# Patient Record
Sex: MALE | Race: WHITE | NOT HISPANIC OR LATINO | Employment: FULL TIME | URBAN - METROPOLITAN AREA
[De-identification: names, ages, dates, MRNs, and addresses within clinical notes are randomized per-mention and may not be internally consistent; named-entity substitution may affect disease eponyms.]

---

## 2017-01-20 ENCOUNTER — OFFICE VISIT - RIVER FALLS (OUTPATIENT)
Dept: FAMILY MEDICINE | Facility: CLINIC | Age: 32
End: 2017-01-20

## 2017-02-10 ENCOUNTER — OFFICE VISIT - RIVER FALLS (OUTPATIENT)
Dept: FAMILY MEDICINE | Facility: CLINIC | Age: 32
End: 2017-02-10

## 2017-02-10 ASSESSMENT — MIFFLIN-ST. JEOR: SCORE: 1548.81

## 2017-02-15 ENCOUNTER — OFFICE VISIT - RIVER FALLS (OUTPATIENT)
Dept: FAMILY MEDICINE | Facility: CLINIC | Age: 32
End: 2017-02-15

## 2017-02-15 ENCOUNTER — COMMUNICATION - RIVER FALLS (OUTPATIENT)
Dept: FAMILY MEDICINE | Facility: CLINIC | Age: 32
End: 2017-02-15

## 2017-04-26 ENCOUNTER — OFFICE VISIT - RIVER FALLS (OUTPATIENT)
Dept: FAMILY MEDICINE | Facility: CLINIC | Age: 32
End: 2017-04-26

## 2017-04-26 ENCOUNTER — COMMUNICATION - RIVER FALLS (OUTPATIENT)
Dept: FAMILY MEDICINE | Facility: CLINIC | Age: 32
End: 2017-04-26

## 2017-07-10 ENCOUNTER — OFFICE VISIT - RIVER FALLS (OUTPATIENT)
Dept: FAMILY MEDICINE | Facility: CLINIC | Age: 32
End: 2017-07-10

## 2017-07-10 ASSESSMENT — MIFFLIN-ST. JEOR: SCORE: 1533.39

## 2019-08-21 ENCOUNTER — OFFICE VISIT - RIVER FALLS (OUTPATIENT)
Dept: FAMILY MEDICINE | Facility: CLINIC | Age: 34
End: 2019-08-21

## 2022-02-11 VITALS
DIASTOLIC BLOOD PRESSURE: 80 MMHG | WEIGHT: 149.6 LBS | HEIGHT: 66 IN | SYSTOLIC BLOOD PRESSURE: 110 MMHG | TEMPERATURE: 98.6 F | HEART RATE: 68 BPM | BODY MASS INDEX: 24.04 KG/M2

## 2022-02-11 VITALS
HEART RATE: 78 BPM | OXYGEN SATURATION: 97 % | TEMPERATURE: 98.8 F | DIASTOLIC BLOOD PRESSURE: 74 MMHG | BODY MASS INDEX: 24.33 KG/M2 | WEIGHT: 149.6 LBS | SYSTOLIC BLOOD PRESSURE: 124 MMHG

## 2022-02-11 VITALS
TEMPERATURE: 98.6 F | HEIGHT: 66 IN | HEART RATE: 84 BPM | SYSTOLIC BLOOD PRESSURE: 131 MMHG | DIASTOLIC BLOOD PRESSURE: 78 MMHG | BODY MASS INDEX: 24.59 KG/M2 | HEART RATE: 64 BPM | DIASTOLIC BLOOD PRESSURE: 76 MMHG | RESPIRATION RATE: 16 BRPM | SYSTOLIC BLOOD PRESSURE: 108 MMHG | TEMPERATURE: 98 F | WEIGHT: 153 LBS

## 2022-02-16 NOTE — PROGRESS NOTES
Patient:   HEIDI WALLER            MRN: [id number]            FIN: [id number]               Age:   31 years     Sex:  Male     :  1985   Associated Diagnoses:   Insomnia   Author:   Marty Currie PA-C      Chief Complaint   2/10/2017 9:08 AM CST    Pt here for asking for sleeping pills for upcoming flight to [place]       History of Present Illness   Chief complaint and symptoms noted above and confirmed with patient   as above, he is taking an overnight flight to [place] and wants to be able to sleep on the plane         Review of Systems   Constitutional:  Negative.    All other systems reviewed and negative      Health Status   Allergies:    Allergic Reactions (Selected)  No known allergies   Medications:  (Selected)   Prescriptions  Prescribed  Anti-Static Valved Holding Chamber: Anti-Static Valved Holding Chamber, See Instructions, Instructions: To be used with Flovent., Supply, # 1 kit(s), 0 Refill(s), Type: Maintenance, Pharmacy: [facility] [id number], To be used with Flovent.  Flovent HFA 44 mcg/inh inhalation aerosol: ( 88 mcg ), inh, bid, # 1 EA, 10 Refill(s), Type: Maintenance, Pharmacy: [facility] [address], 88 mcg inh bid  Ventolin HFA 90 mcg/inh inhalation aerosol: See Instructions, Instructions: USE 2 PUFFS FOUR TIMES DAILY AS NEEDED FOR WHEEZING, USE WITH SPACER CHAMBER, # 36 gm, 5 Refill(s), Type: Soft Stop, Pharmacy: [facility] [id number], USE 2 PUFFS FOUR TIMES DAILY AS NEEDED FOR WHEEZING, USE WITH SP...   Problem list:    All Problems (Selected)  Asthma, Mild Intermittent / ICD-9-.90 / Confirmed      Histories   Past Medical History:    Resolved  History of chicken pox (V12.09):  Resolved.   Family History:    [place] - Lung cancer  Father (Shahid, )  Asthma  Son (Ida)     Procedure history:    None ([id number]).      Physical Examination   Vital Signs   2/10/2017 9:08 AM CST Temperature Tympanic 98 DegF    Peripheral Pulse Rate 64 bpm    Pulse Site Radial  artery    Respiratory Rate 16 br/min    Systolic Blood Pressure 108 mmHg    Diastolic Blood Pressure 76 mmHg    Mean Arterial Pressure 87 mmHg    BP Site Right arm      General:  No acute distress.    Psychiatric:  Appropriate mood & affect.       Impression and Plan   Diagnosis     Insomnia (EIC74-XS G47.00).     Summary:  will do a trial of ambien to see if that works well for him, if not he will call and we can trial something else.    Orders     Orders   Pharmacy:  Ambien 10 mg oral tablet (Prescribe): 1 tab(s) ( 10 mg ), PO, Once a day (at bedtime), PRN: for sleep, # 14 tab(s), 0 Refill(s), Type: Maintenance, Pharmacy: [facility] [id number], 1 tab(s) po hs,PRN:for sleep.     Orders   Charges (Evaluation and Management):  50496 office outpatient visit 15 minutes (Charge) (Order): Quantity: 1, Insomnia.

## 2022-02-16 NOTE — TELEPHONE ENCOUNTER
---------------------  From: Izzy Senior CMA ([facility]Rx Pool ([id number]_[facility]))   To: [facility] ([address]_[facility]);     Sent: 12/24/2020 3:35:40 PM CST  Subject: FW: Medication Management   Due Date/Time: 12/25/2020 12:32:00 PM CST     please advise refill - pt hasn't been seen since 8/21/19      ------------------------------------------  From: [facility] #[id number]  To: Marty Currie PA-C  Sent: December 24, 2020 12:32:42 PM CST  Subject: Medication Management  Due: December 24, 2020 1:57:35 PM CST     ** On Hold Pending Signature **     Dispensed Drug: albuterol (albuterol 90 mcg/inh inhalation aerosol), INHALE 2 PUFFS VIA SPACER BY MOUTH FOUR TIMES DAILY AS NEEDED FOR WHEEZING  Quantity: 36 gm  Days Supply: 50  Refills: 0  Substitutions Allowed  Notes from Pharmacy:  ---------------------------------------------------------------  From: Rashel Byers CMA ([facility] Message Pool ([address]_[facility]))   To: Marty Currie PA-C;     Sent: 12/28/2020 7:08:52 AM CST  Subject: FW: Medication Management   Due Date/Time: 12/25/2020 12:32:00 PM CST---------------------  From: Marty Currie PA-C   To: [facility] #[id number]    Sent: 12/28/2020 7:09:22 AM CST  Subject: FW: Medication Management     ** Submitted: **  Complete:albuterol (Ventolin HFA 90 mcg/inh inhalation aerosol)   Signed by Marty Currie PA-C  12/28/2020 1:09:00 PM [facility]    ** Approved with modifications: **  albuterol (ALBUTEROL HFA INH(200 PUFFS)18GM)  INHALE 2 PUFFS VIA SPACER BY MOUTH FOUR TIMES DAILY AS NEEDED FOR WHEEZING  Qty:  36 gm        Days Supply:  50        Refills:  1          Substitutions Allowed     Route To Pharmacy - [facility] #[id number]

## 2022-02-16 NOTE — NURSING NOTE
Comprehensive Intake Entered On:  8/21/2019 5:45 PM CDT    Performed On:  8/21/2019 5:41 PM CDT by Jessie Santiago RN               Summary   Chief Complaint :   Patient is here for medication refills of inhalers.   Weight Measured :   149.6 lb(Converted to: 149 lb 10 oz, 67.86 kg)    Systolic Blood Pressure :   124 mmHg   Diastolic Blood Pressure :   74 mmHg   Mean Arterial Pressure :   91 mmHg   Peripheral Pulse Rate :   78 bpm   BP Site :   Right arm   BP Method :   Manual   HR Method :   Electronic   Temperature Tympanic :   98.8 DegF(Converted to: 37.1 DegC)    Oxygen Saturation :   97 %   Jessie Santiago RN - 8/21/2019 5:41 PM CDT   Health Status   Hospitalized since last visit? :   No   Inpatient? :   No   ED? :   No   Zeeshan SMITH, Rashel - 8/22/2019 10:06 AM CDT   Allergies Verified? :   Yes   Medication History Verified? :   Yes   Pre-Visit Planning Status :   Completed   Tobacco Use? :   Never smoker   Jessie Santiago RN - 8/21/2019 5:41 PM CDT   Consents   Consent for Immunization Exchange :   Consent Granted   Consent for Immunizations to Providers :   Consent Granted   Jessie Santiago RN - 8/21/2019 5:41 PM CDT   Meds / Allergies   (As Of: 8/21/2019 5:45:31 PM CDT)   Allergies (Active)   No known allergies  Estimated Onset Date:   Unspecified ; Created By:   Suzette Regalado; Reaction Status:   Active ; Category:   Drug ; Substance:   No known allergies ; Type:   Allergy ; Updated By:   Suzette Regalado; Source:   Patient ; Reviewed Date:   8/21/2019 5:43 PM CDT        Medication List   (As Of: 8/21/2019 5:45:31 PM CDT)   Prescription/Discharge Order    penicillin V potassium  :   penicillin V potassium ; Status:   Processing ; Ordered As Mnemonic:   penicillin V potassium 500 mg oral tablet ; Ordering Provider:   Marty Currie PA-C; Action Display:   Complete ; Catalog Code:   penicillin V potassium ; Order Dt/Tm:   8/21/2019 5:43:48 PM          Miscellaneous Prescription  :   Miscellaneous Prescription ; Status:    Prescribed ; Ordered As Mnemonic:   Anti-Static Valved Holding Chamber ; Simple Display Line:   See Instructions, To be used with Flovent., 1 kit(s), 0 Refill(s) ; Ordering Provider:   Rich Pinto MD; Catalog Code:   Miscellaneous Prescription ; Order Dt/Tm:   11/9/2016 9:53:20 AM          fluticasone  :   fluticasone ; Status:   Prescribed ; Ordered As Mnemonic:   Flovent HFA 44 mcg/inh inhalation aerosol ; Simple Display Line:   88 mcg, inh, bid, 1 EA, 10 Refill(s) ; Ordering Provider:   Rich Pinto MD; Catalog Code:   fluticasone ; Order Dt/Tm:   11/9/2016 9:22:17 AM          albuterol  :   albuterol ; Status:   Prescribed ; Ordered As Mnemonic:   Ventolin HFA 90 mcg/inh inhalation aerosol ; Simple Display Line:   See Instructions, USE 2 PUFFS FOUR TIMES DAILY AS NEEDED FOR WHEEZING, USE WITH SPACER CHAMBER, 2 EA, 0 Refill(s) ; Ordering Provider:   Kush HOLMAN, Marty BARNHART; Catalog Code:   albuterol ; Order Dt/Tm:   8/16/2018 11:18:10 AM

## 2022-02-16 NOTE — PROGRESS NOTES
Patient:   HEIDI WALLER            MRN: [id number]            FIN: [id number]               Age:   31 years     Sex:  Male     :  1985   Associated Diagnoses:   None   Author:   Freeman Dodson MD      Physical Examination   Genitourinary:  No costovertebral angle tenderness, No scrotal tenderness, No inguinal tenderness, No urethral discharge, No lesions.       Procedure   Bilateral Vasectomy Procedure   General results.     VASECTOMY  The patient has been previously counseled and gave informed consent.  All questions were answered prior   to the start of the procedure.The patient was put in the supine position.  The penis was taped caudally.   The scrotum was then washed with a Betadine solution.  Sterile drapes were then placed.  The right vas   was then swung to midline using the 3-finger technique.  The skin over the anterior scrotum over the median   raphe was anesthetized using 1% Lidocaine without epinephrine.  A 25-gauge, 1 -inch needle was then   inserted along the right vas in the upper scrotum and an external spermatic sheath block was accomplished.    The needle was withdrawn.  The left vas was swung to midline using the 3-finger technique, the needle was   inserted, and an external spermatic sheath block was performed on the left.  He tolerated the anesthetic   administration well.  The right vas was then again swung to midline using the 3-finger technique.  The right vas   was grasped just below the anesthetized skin using the ring clamp.  The skin above the vas was incised with a   15-blade scalpel.  The vas forceps was used to lift the vas through the small skin incision site.  The loop of vas   was then regrasped with the vas clamp.  The fascial tissues were dissected off the vas.  The vas was then ligated   using 2-0 Vicryl ligature on either side of the elevated loop of vas.  The vas was then transected.  The proximal   and distal cut ends of the vas were cauterized using the Bovie unit.   There was good hemostasis noted and the   proximal and distal ends were placed back below the fascial tissues again.  He reported tugging in the right groin   when we tugged on the right vas.  The  -inch piece of vas was then placed in Formalin.  Good hemostasis was   again noted.  The left side was swung to midline using the 3-finger technique.  The left vas was grasped through   the previous skin incision using the vas clamp.  The patient reported tugging in the left groin confirming grasp of the   left vas.  The left vas was delivered through the skin and the same procedure that was performed on the right was   performed on the left.  There were not complications.  He tolerated the procedure well.    After completion of the procedure the scrotum was washed off with a saline solution.  Benzoin was used and a   Steri-Strip was used to close the small skin incision.  Good hemostasis was noted at the site.  Antibiotic ointment and   gauze were placed over the incision.  The patient was given extensive postoperative instructions.  He will apply antibiotic   ointment to the incision daily.  He will watch for any signs of infection such as pus, redness, or swelling.  He will rest and   use acetaminophen, ibuprofen, or Vicodin for discomfort.  He will apply ice to the scrotum as needed.  The patient   will refrain from any heavy lifting or straining for a week.  He will attempt sexual activity in a week and is aware that the   first ejaculate may contain a drop of blood or produce sharp pain.  It was emphasized to continue contraception until two   sperm evaluations were noted to be clear.  The patient will return for sperm checks in approximately 6 and 12 weeks.    The patient was informed to return if any other problems develop.  Hewas given patient instruction sheets.  The patient   was given Vicodin one tablet every 4 to 6 hours p.r.n. for pain.  We warned him of side effects.         Impression and Plan    Counseled:  Patient, Regarding diagnosis, Regarding treatment, Regarding medications.

## 2022-02-16 NOTE — PROGRESS NOTES
Patient:   HEIDI WALLER            MRN: [id number]            FIN: [id number]               Age:   34 years     Sex:  Male     :  1985   Associated Diagnoses:   Asthma, Mild Intermittent   Author:   Kush HOLMAN, Marty BARNHART      Visit Information   Visit type:  Annual exam.       Chief Complaint   2019 5:41 PM CDT    Patient is here for medication refills of inhalers.        History of Present Illness   Chief complaint and symptoms noted above and confirmed with patient   has mild intermittent asthma, has flovent that he rarely uses, also has albuterol MDI which he uses prn (sometimes 3 x/week but other times not at all)  ACT score is 19 today         Well Adult History   Well Adult History             The patient presents for well adult exam, here today for refills on his inhalers, no other concerns today.  The patient's general health status is described as good.  Additional kelly.  Additional pertinent history: daily caffeine use, tobacco use none and daily alcohol use.  Patient's PHQ9 CAGE questionnaire reviewed and discussed with patient.   .        Review of Systems   Constitutional:  Negative.    Ear/Nose/Mouth/Throat:  Negative.    Respiratory:  Negative.    Cardiovascular:  Negative.    Gastrointestinal:  Negative.    Psychiatric:  PHQ-9=  0.       Health Status   Allergies:    Allergic Reactions (All)  No known allergies   Medications:  (Selected)   Prescriptions  Prescribed  Anti-Static Valved Holding Chamber: Anti-Static Valved Holding Chamber, See Instructions, Instructions: To be used with Flovent., Supply, # 1 kit(s), 0 Refill(s), Type: Maintenance, Pharmacy: [facility] [id number], To be used with Flovent.  Flovent HFA 44 mcg/inh inhalation aerosol: ( 88 mcg ), inh, bid, # 1 EA, 10 Refill(s), Type: Maintenance, Pharmacy: [facility] [address], 88 mcg inh bid  Ventolin HFA 90 mcg/inh inhalation aerosol: See Instructions, Instructions: USE 2 PUFFS FOUR TIMES DAILY AS NEEDED FOR WHEEZING,  USE WITH SPACER CHAMBER, # 2 EA, 0 Refill(s), Type: Soft Stop, Pharmacy: [facility] [address], Patient is due to see provider for further refills., USE 2 PUFFS...   Problem list:    All Problems (Selected)  Asthma, Mild Intermittent / ICD-9-.90 / Confirmed      Histories   Past Medical History:    Resolved  History of chicken pox (V12.09):  Resolved.   Family History:    [place] - Lung cancer  Father (Shahid, )  Asthma  Son (Ida)     Procedure history:    None ([id number]).   Social History:        Alcohol Assessment: Current            Current                     Comments:                      2017 - Sue Howell                     3 drinks 3 times per week      Tobacco Assessment: Denies Tobacco Use            Never smoker      Substance Abuse Assessment: Denies Substance Abuse            Never      Employment and Education Assessment            Employed, Work/School description: .      Home and Environment Assessment            Marital status: .  Spouse/Partner name: Lea Beauchamp.      Exercise and Physical Activity Assessment: Occasional exercise            Exercise frequency: 1-2 times/week.  Exercise type: Bicycling.      Sexual Assessment            Sexually active: Yes.  Sexual orientation: Heterosexual.      Other Assessment            declined Flu shot        Physical Examination   Vital Signs   2019 5:41 PM CDT Temperature Tympanic 98.8 DegF    Peripheral Pulse Rate 78 bpm    HR Method Electronic    Systolic Blood Pressure 124 mmHg    Diastolic Blood Pressure 74 mmHg    Mean Arterial Pressure 91 mmHg    BP Site Right arm    BP Method Manual    Oxygen Saturation 97 %      Measurements from flowsheet : Measurements   2019 5:41 PM CDT    Weight Measured - Standard                149.6 lb     General:  No acute distress.    Eye:  Pupils are equal, round and reactive to light, Extraocular movements are intact.    HENT:  Tympanic membranes are clear, No  pharyngeal erythema, No sinus tenderness.    Neck:  Supple, Non-tender, No lymphadenopathy.    Respiratory:  Lungs are clear to auscultation.    Cardiovascular:  Normal rate, Regular rhythm, No murmur.    Gastrointestinal:  Soft, Non-tender, Non-distended, Normal bowel sounds, No organomegaly.    Psychiatric:  Appropriate mood & affect.       Health Maintenance   Immunization schedule      Impression and Plan   Diagnosis     Asthma, Mild Intermittent (JTT14-TT J45.20).     Course:  Well controlled.    Summary:  will refill his inhalers, can use his Flovent prn when his asthma flares.    Orders     Orders   Pharmacy:  Ventolin HFA 90 mcg/inh inhalation aerosol (Prescribe): See Instructions, Instructions: USE 2 PUFFS FOUR TIMES DAILY AS NEEDED FOR WHEEZING, USE WITH SPACER CHAMBER, # 2 EA, 5 Refill(s), Type: Soft Stop, Pharmacy: [facility] #[id number], USE 2 PUFFS FOUR TIMES DAILY AS NEEDED FOR WHEEZING, USE WITH SPACER CHAMBER  Flovent HFA 44 mcg/inh inhalation aerosol (Prescribe): = 2 puff(s), inh, bid, # 1 EA, 10 Refill(s), Type: Maintenance, Pharmacy: [facility] #[id number], 2 puff(s) Inhale bid  Flovent HFA 44 mcg/inh inhalation aerosol (Modify): ( 88 mcg ), inh, bid, # 1 EA, 10 Refill(s), Type: Hard Stop, Pharmacy: [facility] [address]  Ventolin HFA 90 mcg/inh inhalation aerosol (Modify): See Instructions, Instructions: USE 2 PUFFS FOUR TIMES DAILY AS NEEDED FOR WHEEZING, USE WITH SPACER CHAMBER, # 2 EA, 0 Refill(s), Type: Hard Stop, Pharmacy: [facility] [address], Patient is due to see provider for further refills..     Orders   Charges (Evaluation and Management):  97369 office outpatient visit 15 minutes (Charge) (Order): Quantity: 1, Asthma, Mild Intermittent.

## 2022-02-16 NOTE — NURSING NOTE
CAGE Assessment Entered On:  8/22/2019 10:07 AM CDT    Performed On:  8/21/2019 10:07 AM CDT by Rashel Byers CMA               Assessment   Have you ever felt you should cut down on your drinking :   No   Have people annoyed you by criticizing your drinking :   No   Have you ever felt bad or guilty about your drinking :   No   Have you ever taken a drink first thing in the morning to steady your nerves or get rid of a hangover (Eye-opener) :   No   CAGE Score :   0    Rashel Byers CMA - 8/22/2019 10:07 AM CDT

## 2022-02-16 NOTE — NURSING NOTE
Asthma Control Test (ACT) Total Entered On:  8/22/2019 10:06 AM CDT    Performed On:  8/21/2019 10:06 AM CDT by Rashel Byers CMA               Asthma Control Test (ACT) Total   Asthma Control Test Total (Adult) :   19    Rashel Byers CMA - 8/22/2019 10:06 AM CDT

## 2022-02-16 NOTE — PROGRESS NOTES
Patient:   HEIDI WALLER            MRN: [id number]            FIN: [id number]               Age:   32 years     Sex:  Male     :  1985   Associated Diagnoses:   Left ankle sprain   Author:   Marty Currie PA-C      Chief Complaint   7/10/2017 11:56 AM CDT   c/o left ankle swelling/pain x1wk.   denies injury to ankle or foot.  is on his feet alot at work.   similar thing happened to right foot previously.        History of Present Illness   Chief complaint and symptoms noted above and confirmed with patient   left ankle swelling over the past week, no real injury, but is on his feet at work all day and by Friday (3 days ago) had swelling around lateral maleolus,   he rested this weekend and swelling is much reduced, no pain with walking      Health Status   Allergies:    Allergic Reactions (Selected)  No known allergies   Medications:  (Selected)   Prescriptions  Prescribed  Anti-Static Valved Holding Chamber: Anti-Static Valved Holding Chamber, See Instructions, Instructions: To be used with Flovent., Supply, # 1 kit(s), 0 Refill(s), Type: Maintenance, Pharmacy: [facility] [id number], To be used with Flovent.  Flovent HFA 44 mcg/inh inhalation aerosol: ( 88 mcg ), inh, bid, # 1 EA, 10 Refill(s), Type: Maintenance, Pharmacy: [facility] [address], 88 mcg inh bid  Ventolin HFA 90 mcg/inh inhalation aerosol: See Instructions, Instructions: USE 2 PUFFS FOUR TIMES DAILY AS NEEDED FOR WHEEZING, USE WITH SPACER CHAMBER, # 2 EA, 5 Refill(s), Type: Soft Stop, Pharmacy: [facility] [address], USE 2 PUFFS FOUR TIMES DAILY AS NEEDED FOR WHEEZING, USE WITH SPA...   Problem list:    All Problems (Selected)  Asthma, Mild Intermittent / ICD-9-.90 / Confirmed      Histories   Past Medical History:    Resolved  History of chicken pox (V12.09):  Resolved.   Family History:    [place] - Lung cancer  Father (Shahid, )  Asthma  Son (Ida)     Procedure history:    None ([id number]).      Physical  Examination   Vital Signs   7/10/2017 11:56 AM CDT Temperature Tympanic 98.6 DegF    Peripheral Pulse Rate 68 bpm    Pulse Site Radial artery    HR Method Manual    Systolic Blood Pressure 110 mmHg    Diastolic Blood Pressure 80 mmHg    Mean Arterial Pressure 90 mmHg    BP Site Right arm    BP Method Manual      Measurements from flowsheet : Measurements   7/10/2017 11:56 AM CDT Height Measured - Standard 65.5 in    Weight Measured - Standard 149.6 lb    BSA 1.77 m2    Body Mass Index 24.51 kg/m2      General:  No acute distress.    Musculoskeletal:  Milld  swelling but no deformation of left ankle especially just anterior to lateral maleolus.  Good active ROM.  Good posterior tibial and dorsal pedal pulses.  There no is tenderness to palpation over the lateral maleolus or  the medial maleolus or over the base of the 5th metatarsal.  .       Impression and Plan   Diagnosis     Left ankle sprain (GXH56-XZ S93.402A).     Course:  Progressing as expected.    Summary:  suspect that he had a mild sprain of left ankle that is progressing as expected but still has some residual swelling,  reassurance given, expect that swelling should resolved within the next week, can wrap the ankle, use elevation; follow up if not improving or worsening, his dog chewed up one of his albuterol inhalers (he uses it intermittentyl), will send in a refill for that inhaler.    Orders     Orders   Charges (Evaluation and Management):  59292 office outpatient visit 15 minutes (Charge) (Order): Quantity: 1, Left ankle sprain.

## 2022-02-16 NOTE — TELEPHONE ENCOUNTER
Entered by Theodora Aleman CMA on August 15, 2019 12:26:55 PM CDT  contacted pt at 1224 and advised appt is needed, verified that pt wasn't having any acute issues at this time  pt transferred to scheduling to set up for an appt      ------------------------------------------  From: [facility] #[id number]  To: Kush HOLMAN, Marty BARNHART  Sent: August 14, 2019 5:45:53 PM CDT  Subject: Medication Management  Due: August 15, 2019 5:45:53 PM CDT    ** On Hold Pending Signature **  Drug: albuterol (Ventolin HFA 90 mcg/inh inhalation aerosol)  USE 2 PUFFS FOUR TIMES DAILY AS NEEDED FOR WHEEZING, USE WITH SPACER CHAMBER  Quantity: 2 unknown unit  Days Supply: 0         Refills: 0  Substitutions Allowed  Notes from Pharmacy: Patient is due to see provider for further refills.    Dispensed Drug: albuterol (albuterol 90 mcg/inh inhalation aerosol)  USE 2 PUFFS FOUR TIMES DAILY AS NEEDED FOR WHEEZING, USE WITH SPACER CHAMBER  Quantity: 36 gm       Days Supply: 40        Refills: 0  Substitutions Allowed  Notes from Pharmacy:   ------------------------------------------

## 2022-02-16 NOTE — NURSING NOTE
Depression Screening Entered On:  8/22/2019 10:07 AM CDT    Performed On:  8/21/2019 10:07 AM CDT by Rashel Byers CMA               Depression Screening   Little Interest - Pleasure in Activities :   Not at all   Feeling Down, Depressed, Hopeless :   Not at all   Initial Depression Screen Score :   0    Trouble Falling or Staying Asleep :   Not at all   Feeling Tired or Little Energy :   Not at all   Poor Appetite or Overeating :   Not at all   Feeling Bad About Yourself :   Not at all   Trouble Concentrating :   Not at all   Moving or Speaking Slowly :   Not at all   Thoughts Better Off Dead or Hurting Self :   Not at all   Detailed Depression Screen Score :   0    Total Depression Screen Score :   0    BRITTANY Difficulty with Work, Home, Others :   Not difficult at all   Rashel Byers CMA - 8/22/2019 10:07 AM CDT

## 2022-02-16 NOTE — NURSING NOTE
Patient left message asking for a refill of his inhaler.      Protocol refill sent in with note stating patient needs to be seen for further refills has he is due for a visit.

## 2023-07-11 ENCOUNTER — TELEPHONE (OUTPATIENT)
Dept: NURSING | Facility: CLINIC | Age: 38
End: 2023-07-11

## 2023-07-11 ENCOUNTER — NURSE TRIAGE (OUTPATIENT)
Dept: NURSING | Facility: CLINIC | Age: 38
End: 2023-07-11
Payer: COMMERCIAL

## 2023-07-11 NOTE — TELEPHONE ENCOUNTER
"Triage Call:     Pt calling to report that he has been having blood in his urine for the past couple of days. No pure blood or blood clots    No burinng with urination, no fever, able to empty bladder  Feels some \"tugging\" in his lower abdomen area    Disposition: See in office today or tomorrow. Pt was given care advice and was transferred to scheduling. [place] [facility] [place]     Reason for Disposition    All other patients with blood in urine  (Exception: Could be normal menstrual bleeding.)    Additional Information    Negative: Shock suspected (e.g., cold/pale/clammy skin, too weak to stand, low BP, rapid pulse)    Negative: Sounds like a life-threatening emergency to the triager    Negative: Recent back or abdominal injury    Negative: Recent genital injury    Negative: Unable to urinate (or only a few drops) > 4 hours and bladder feels very full (e.g., palpable bladder or strong urge to urinate)    Negative: Passing pure blood or large blood clots (i.e., larger than a dime or grape)    Negative: Fever > 100.4 F (38.0 C)    Negative: Patient sounds very sick or weak to the triager    Negative: Urinary catheter, questions about    Negative: Taking Coumadin (warfarin) or other strong blood thinner, or known bleeding disorder (e.g., thrombocytopenia)    Negative: Known sickle cell disease    Negative: Side (flank) or back pain present    Negative: Pain or burning with passing urine (urination)    Negative: Patient wants to be seen    Negative: Pink or red-colored urine and likely from food (beets, rhubarb, red food dye) and lasts > 24 hours after stopping food    Protocols used: URINE - BLOOD IN-A-OH    Yumiko Snyder RN  [facility] Nurse Advisor 9:11 AM 7/11/2023  "

## 2023-07-11 NOTE — TELEPHONE ENCOUNTER
Pt's wife calling. She is not with the patient right now.     She states that patient has a break at work soon and is going to have patient call the clinic back then.     Yumiko Snyder RN  [facility] Nurse Advisor 8:19 AM 7/11/2023

## 2024-02-14 ENCOUNTER — OFFICE VISIT (OUTPATIENT)
Dept: FAMILY MEDICINE | Facility: CLINIC | Age: 39
End: 2024-02-14
Payer: COMMERCIAL

## 2024-02-14 ENCOUNTER — ANCILLARY PROCEDURE (OUTPATIENT)
Dept: GENERAL RADIOLOGY | Facility: CLINIC | Age: 39
End: 2024-02-14
Attending: PHYSICIAN ASSISTANT
Payer: COMMERCIAL

## 2024-02-14 VITALS
TEMPERATURE: 97.5 F | WEIGHT: 154.7 LBS | OXYGEN SATURATION: 96 % | HEART RATE: 76 BPM | DIASTOLIC BLOOD PRESSURE: 78 MMHG | BODY MASS INDEX: 24.86 KG/M2 | HEIGHT: 66 IN | RESPIRATION RATE: 16 BRPM | SYSTOLIC BLOOD PRESSURE: 120 MMHG

## 2024-02-14 DIAGNOSIS — M25.572 PAIN IN JOINT INVOLVING ANKLE AND FOOT, LEFT: ICD-10-CM

## 2024-02-14 DIAGNOSIS — Z13.6 SCREENING FOR CARDIOVASCULAR CONDITION: ICD-10-CM

## 2024-02-14 DIAGNOSIS — Z00.00 ANNUAL PHYSICAL EXAM: Primary | ICD-10-CM

## 2024-02-14 PROCEDURE — 80061 LIPID PANEL: CPT | Performed by: PHYSICIAN ASSISTANT

## 2024-02-14 PROCEDURE — 99386 PREV VISIT NEW AGE 40-64: CPT | Performed by: PHYSICIAN ASSISTANT

## 2024-02-14 PROCEDURE — 36415 COLL VENOUS BLD VENIPUNCTURE: CPT | Performed by: PHYSICIAN ASSISTANT

## 2024-02-14 PROCEDURE — 82947 ASSAY GLUCOSE BLOOD QUANT: CPT | Mod: QW | Performed by: PHYSICIAN ASSISTANT

## 2024-02-14 PROCEDURE — 73610 X-RAY EXAM OF ANKLE: CPT | Mod: TC | Performed by: RADIOLOGY

## 2024-02-14 SDOH — HEALTH STABILITY: PHYSICAL HEALTH: ON AVERAGE, HOW MANY DAYS PER WEEK DO YOU ENGAGE IN MODERATE TO STRENUOUS EXERCISE (LIKE A BRISK WALK)?: 7 DAYS

## 2024-02-14 SDOH — HEALTH STABILITY: PHYSICAL HEALTH: ON AVERAGE, HOW MANY MINUTES DO YOU ENGAGE IN EXERCISE AT THIS LEVEL?: 30 MIN

## 2024-02-14 ASSESSMENT — SOCIAL DETERMINANTS OF HEALTH (SDOH): HOW OFTEN DO YOU GET TOGETHER WITH FRIENDS OR RELATIVES?: ONCE A WEEK

## 2024-02-14 NOTE — PROGRESS NOTES
Preventive Care Visit  [facility]  ZULEYKA Matos, Family Medicine  Feb 14, 2024    Assessment & Plan     (Z00.00) Annual physical exam  (primary encounter diagnosis)  Comment: Return in 1 year and as needed  Plan: Glucose, Lipid panel reflex to direct LDL         Non-fasting        Labs pending    (Z13.6) Screening for cardiovascular condition  Comment: Labs pending  Plan: Lipid panel reflex to direct LDL Non-fasting            (M25.572) Pain in joint involving ankle and foot, left  Comment: Recommended ice ibuprofen at nighttime for 5 to 7 days it should improve not acutely worsen or he will recheck  Plan: XR Ankle Left G/E 3 Views                    Counseling  Appropriate preventive services were discussed with this patient, including applicable screening as appropriate for fall prevention, nutrition, physical activity, Tobacco-use cessation, weight loss and cognition.  Checklist reviewing preventive services available has been given to the patient.  Reviewed patient's diet, addressing concerns and/or questions.             Juan Garcia is a 38 year old, presenting for the following:  Physical        2/14/2024     4:36 PM   Additional Questions   Roomed by SARAH Gorman        Health Care Directive  Patient does not have a Health Care Directive or Living Will: Discussed advance care planning with patient; however, patient declined at this time.    38-year-old male plan to the clinic for annual exam he has not had an annual exam in a number of years  The only concern he has is his left ankle he did not recall doing anything to it he does finished from work and is been doing a lot of baseboard work and up and down steps etc. he has skateboard a lot in the past but recalls injury to the right ankle and onto the left  No other complaints or concerns                  2/14/2024   General Health   How would you rate your overall physical health? Good   Feel stress (tense, anxious, or  unable to sleep) Not at all         2/14/2024   Nutrition   Three or more servings of calcium each day? Yes   Diet: Regular (no restrictions)   How many servings of fruit and vegetables per day? (!) 2-3   How many sweetened beverages each day? 0-1         2/14/2024   Exercise   Days per week of moderate/strenous exercise 7 days   Average minutes spent exercising at this level 30 min         2/14/2024   Social Factors   Frequency of gathering with friends or relatives Once a week   Worry food won't last until get money to buy more No   Food not last or not have enough money for food? No   Do you have housing?  Yes   Are you worried about losing your housing? No   Lack of transportation? No   Unable to get utilities (heat,electricity)? No         2/14/2024   Dental   Dentist two times every year? Yes         2/14/2024   TB Screening   Were you born outside of [place]?  No         Today's PHQ-2 Score:       2/14/2024     4:32 PM   PHQ-2 ( 1999 [employer])   Q1: Little interest or pleasure in doing things 0   Q2: Feeling down, depressed or hopeless 0   PHQ-2 Score 0   Q1: Little interest or pleasure in doing things Not at all   Q2: Feeling down, depressed or hopeless Not at all   PHQ-2 Score 0           2/14/2024   Substance Use   Alcohol more than 3/day or more than 7/wk No   Do you use any other substances recreationally? (!) CANNABIS PRODUCTS     Social History     Tobacco Use    Smoking status: Never     Passive exposure: Never    Smokeless tobacco: Current    Tobacco comments:     Tobacco pouches    Vaping Use    Vaping Use: Never used             2/14/2024   One time HIV Screening   Previous HIV test? No         2/14/2024   STI Screening   New sexual partner(s) since last STI/HIV test? No   The ASCVD Risk score (Meg GREEN, et al., 2019) failed to calculate for the following reasons:    Cannot find a previous HDL lab    Cannot find a previous total cholesterol lab        2/14/2024   Contraception/Family Planning   Questions  "about contraception or family planning No        Reviewed and updated as needed this visit by Provider                          Review of Systems  Constitutional, HEENT, cardiovascular, pulmonary, GI, , musculoskeletal, neuro, skin, endocrine and psych systems are negative, except as otherwise noted.     Objective    Exam  /78 (BP Location: Right arm, Patient Position: Sitting, Cuff Size: Adult Regular)   Pulse 76   Temp 97.5  F (36.4  C) (Tympanic)   Resp 16   Ht 1.676 m (5' 6\")   Wt 70.2 kg (154 lb 11.2 oz)   SpO2 96%   BMI 24.97 kg/m     Estimated body mass index is 24.97 kg/m  as calculated from the following:    Height as of this encounter: 1.676 m (5' 6\").    Weight as of this encounter: 70.2 kg (154 lb 11.2 oz).    Physical Exam  Vitals and nursing note reviewed.   Constitutional:       Appearance: Normal appearance.   HENT:      Head: Normocephalic and atraumatic.      Right Ear: Tympanic membrane normal.      Left Ear: Tympanic membrane normal.      Nose: Nose normal. No congestion or rhinorrhea.      Mouth/Throat:      Mouth: Mucous membranes are moist.   Eyes:      Conjunctiva/sclera: Conjunctivae normal.   Cardiovascular:      Rate and Rhythm: Normal rate and regular rhythm.      Heart sounds: Normal heart sounds.   Pulmonary:      Effort: Pulmonary effort is normal.      Breath sounds: Normal breath sounds.   Abdominal:      General: Abdomen is flat. Bowel sounds are normal.      Palpations: There is no mass.      Tenderness: There is no abdominal tenderness.   Musculoskeletal:         General: Normal range of motion.      Cervical back: Normal range of motion and neck supple.      Right lower leg: No edema.      Left lower leg: No edema.      Comments: He has a little swelling just anterior to the lateral malleolus on the left-hand side good range of motion he has mild tenderness with extreme of plantarflexion   Lymphadenopathy:      Cervical: No cervical adenopathy.   Skin:     General: " Skin is warm and dry.   Neurological:      General: No focal deficit present.   Psychiatric:         Mood and Affect: Mood normal.         Behavior: Behavior normal.         Thought Content: Thought content normal.         Judgment: Judgment normal.             Signed Electronically by: ZULEYKA Matos

## 2024-02-14 NOTE — LETTER
February 16, 2024      Jose Beauchamp  [address] [address]        Dear ,    We are writing to inform you of your test results.    Your test results fall within the expected range(s) or remain unchanged from previous results.  Please continue with current treatment plan.    Resulted Orders   Glucose   Result Value Ref Range    Glucose 87 70 - 99 mg/dL    Patient Fasting > 8hrs? No    Lipid panel reflex to direct LDL Non-fasting   Result Value Ref Range    Cholesterol 153 <200 mg/dL    Triglycerides 63 <150 mg/dL    Direct Measure HDL 51 >=40 mg/dL    LDL Cholesterol Calculated 89 <=100 mg/dL    Non HDL Cholesterol 102 <130 mg/dL    Patient Fasting > 8hrs? No     Narrative    Cholesterol  Desirable:  <200 mg/dL    Triglycerides  Normal:  Less than 150 mg/dL  Borderline High:  150-199 mg/dL  High:  200-499 mg/dL  Very High:  Greater than or equal to 500 mg/dL    Direct Measure HDL  Female:  Greater than or equal to 50 mg/dL   Male:  Greater than or equal to 40 mg/dL    LDL Cholesterol  Desirable:  <100mg/dL  Above Desirable:  100-129 mg/dL   Borderline High:  130-159 mg/dL   High:  160-189 mg/dL   Very High:  >= 190 mg/dL    Non HDL Cholesterol  Desirable:  130 mg/dL  Above Desirable:  130-159 mg/dL  Borderline High:  160-189 mg/dL  High:  190-219 mg/dL  Very High:  Greater than or equal to 220 mg/dL       If you have any questions or concerns, please call the clinic at the number listed above.       Sincerely,      ZULEYKA Uribe

## 2024-02-15 LAB
CHOLEST SERPL-MCNC: 153 MG/DL
FASTING STATUS PATIENT QL REPORTED: NO
FASTING STATUS PATIENT QL REPORTED: NO
GLUCOSE SERPL-MCNC: 87 MG/DL (ref 70–99)
HDLC SERPL-MCNC: 51 MG/DL
LDLC SERPL CALC-MCNC: 89 MG/DL
NONHDLC SERPL-MCNC: 102 MG/DL
TRIGL SERPL-MCNC: 63 MG/DL

## 2024-03-02 ENCOUNTER — OFFICE VISIT (OUTPATIENT)
Dept: URGENT CARE | Facility: URGENT CARE | Age: 39
End: 2024-03-02
Payer: COMMERCIAL

## 2024-03-02 ENCOUNTER — ANCILLARY PROCEDURE (OUTPATIENT)
Dept: GENERAL RADIOLOGY | Facility: CLINIC | Age: 39
End: 2024-03-02
Attending: FAMILY MEDICINE
Payer: COMMERCIAL

## 2024-03-02 VITALS
OXYGEN SATURATION: 98 % | WEIGHT: 153 LBS | DIASTOLIC BLOOD PRESSURE: 83 MMHG | RESPIRATION RATE: 16 BRPM | SYSTOLIC BLOOD PRESSURE: 119 MMHG | BODY MASS INDEX: 24.69 KG/M2 | HEART RATE: 70 BPM | TEMPERATURE: 98.2 F

## 2024-03-02 DIAGNOSIS — M79.602 PAIN OF LEFT UPPER EXTREMITY: ICD-10-CM

## 2024-03-02 DIAGNOSIS — M79.602 PAIN OF LEFT UPPER EXTREMITY: Primary | ICD-10-CM

## 2024-03-02 PROCEDURE — 73130 X-RAY EXAM OF HAND: CPT | Mod: TC | Performed by: RADIOLOGY

## 2024-03-02 PROCEDURE — 99203 OFFICE O/P NEW LOW 30 MIN: CPT

## 2024-03-02 NOTE — PROGRESS NOTES
Assessment & Plan     Pain of left upper extremity    - XR Hand Left G/E 3 Views; Future  - amoxicillin-clavulanate (AUGMENTIN) 875-125 MG tablet; Take 1 tablet by mouth 2 times daily for 10 days    Will treat as cellulitis.  Tdap UTD 2016.  Negative xray today per RADs.  Continue with REST/ELEVATION/NSAIDs/ICE and close Follow-up if no change or new or worsening sx prn.    Bobbi Westbrook MD    Juan Garcia is a 38 year old, presenting for the following health issues:  Work Comp (Swollen and painful left hand. Punctured with at work by a staple on 02/27/2024 while setting a door. Works for the company of [employer] out of [employer] [employer].)    HPI     Bro.  Was taking cardboard off a new door at work and punctured LEFT hand right over 2nd MCP joint on Tuesday.  Area has become progressively redder and more swollen and painful.  No fever.  No streaking.  No drainage.    Review of Systems  Constitutional, HEENT, cardiovascular, pulmonary, GI, , musculoskeletal, neuro, skin, endocrine and psych systems are negative, except as otherwise noted.      Objective    /83 (BP Location: Right arm, Patient Position: Sitting)   Pulse 70   Temp 98.2  F (36.8  C) (Tympanic)   Resp 16   Wt 69.4 kg (153 lb)   SpO2 98%   BMI 24.69 kg/m    Body mass index is 24.69 kg/m .  Physical Exam   GENERAL: alert and no distress  EYES: Eyes grossly normal to inspection, PERRL and conjunctivae and sclerae normal  LEFT HAND- redness and swelling over LEFT 2nd MCP joint and extending into entire second digit-- hard to bend second digit due to swelling.  No drainage from puncture site.  No streaking.  PSYCH: mentation appears normal, affect normal/bright    Xray -     EXAM: XR HAND LEFT G/E 3 VIEWS  LOCATION: [facility]  DATE: 3/2/2024     INDICATION: puncture wound by stable over 2nd MCP joint Tuesday  now increased pain and swelling and decreaased ROM  COMPARISON: None.                                                                       IMPRESSION: The left hand is negative for fracture or dislocation. No foreign bodies are identified. Slight soft tissue swelling of the index finger relative to the adjacent fingers. Mild degenerative change at the first CMC joint.    Signed Electronically by: [facility]

## 2025-03-16 ENCOUNTER — HEALTH MAINTENANCE LETTER (OUTPATIENT)
Age: 40
End: 2025-03-16

## 2025-07-24 ENCOUNTER — OFFICE VISIT (OUTPATIENT)
Dept: URGENT CARE | Facility: URGENT CARE | Age: 40
End: 2025-07-24

## 2025-07-24 VITALS
WEIGHT: 149 LBS | SYSTOLIC BLOOD PRESSURE: 110 MMHG | DIASTOLIC BLOOD PRESSURE: 86 MMHG | RESPIRATION RATE: 16 BRPM | BODY MASS INDEX: 23.95 KG/M2 | TEMPERATURE: 97.2 F | HEIGHT: 66 IN | HEART RATE: 60 BPM | OXYGEN SATURATION: 98 %

## 2025-07-24 DIAGNOSIS — S69.92XA HAND INJURY, LEFT, INITIAL ENCOUNTER: Primary | ICD-10-CM

## 2025-07-24 ASSESSMENT — PAIN SCALES - GENERAL: PAINLEVEL_OUTOF10: SEVERE PAIN (9)

## 2025-07-24 NOTE — PROGRESS NOTES
"40-year-old who presented to urgent care with his left hand wrapped in a bulky dressing following a tablesaw injury which created lacerations of multiple fingers.  He states that the bleeding was so significant he is not sure if there were areas of amputation.  He felt lightheaded on the ride in-\"passed out a couple of times\".  He currently does not feel lightheaded or dizzy.  Vitals were assessed by the nurse on arrival and are stable with a heart rate of 60 and a blood pressure 110/86.  Patient came to urgent care as this was the closest medical facility to his worksite.  Discussed with patient that emergency room may be better equipped to fully evaluate and treat his significant injury, and he was in agreement with that and would like to proceed to the nearest ER by private vehicle (someone else driving).  "

## 2025-07-24 NOTE — PROGRESS NOTES
"Urgent Care Clinic Visit    Chief Complaint   Patient presents with    Laceration     Laceration to left hand from table saw. Patient reports he \"passed out\" multiple times on drive here. Patient reports large amount of blood loss. Injury occurred approximately ten minutes PTA. Last tetanus 11/9/16. Patient is not on blood thinners.            Review of last Tetanus vaccine: Tetanus vaccine has been given within past 10 years          7/24/2025     1:02 PM   Additional Questions   Roomed by CALE Lazcano   Accompanied by job supervisor         7/24/2025   Forms   Any forms needing to be completed Yes     Neno hSin LPN    "